# Patient Record
Sex: FEMALE | Race: WHITE | Employment: OTHER | URBAN - METROPOLITAN AREA
[De-identification: names, ages, dates, MRNs, and addresses within clinical notes are randomized per-mention and may not be internally consistent; named-entity substitution may affect disease eponyms.]

---

## 2018-02-15 ENCOUNTER — OFFICE VISIT (OUTPATIENT)
Dept: FAMILY MEDICINE CLINIC | Age: 43
End: 2018-02-15

## 2018-02-15 VITALS
WEIGHT: 166.8 LBS | SYSTOLIC BLOOD PRESSURE: 102 MMHG | RESPIRATION RATE: 16 BRPM | DIASTOLIC BLOOD PRESSURE: 70 MMHG | HEIGHT: 64 IN | BODY MASS INDEX: 28.48 KG/M2 | HEART RATE: 57 BPM | OXYGEN SATURATION: 98 % | TEMPERATURE: 97.9 F

## 2018-02-15 DIAGNOSIS — R51.9 NONINTRACTABLE HEADACHE, UNSPECIFIED CHRONICITY PATTERN, UNSPECIFIED HEADACHE TYPE: Primary | ICD-10-CM

## 2018-02-15 PROCEDURE — 99202 OFFICE O/P NEW SF 15 MIN: CPT | Performed by: FAMILY MEDICINE

## 2018-02-15 NOTE — PROGRESS NOTES
Kamilah Rachel   YOB: 1975    Date of Visit:  2/15/2018        Chief Complaint   Patient presents with    Established New Doctor         HPI:    Patient is a 57-year-old female without significant past medical history presenting for evaluation of headache disorder. She describes her headaches as frontal with involvement of her orbital and temporal areas. The headaches are dull and sometimes throbbing. There has been associated nausea and neck pain on a few occasions. Patient reports to increased frequency and severity of headaches for the several weeks. She has tried Tylenol, Motrin and Vicodin without significant improvement. She has not sought prior medical evaluation for this condition. No Known Allergies         No outpatient prescriptions have been marked as taking for the 2/15/18 encounter (Office Visit) with Carmencita Swanson MD.           Patient's past medical, surgical, social and family histories were reviewed and updated as appropriate. Review of Systems   Constitutional: Negative. Neurological: Positive for headaches. Psychiatric/Behavioral: Negative. Physical Exam   Constitutional: She appears well-developed and well-nourished. No distress. HENT:   Head: Normocephalic and atraumatic. Right Ear: Hearing and external ear normal.   Left Ear: Hearing and external ear normal.   Nose: Nose normal. No rhinorrhea. Eyes: Conjunctivae and EOM are normal. No scleral icterus. Neck: Neck supple. No JVD present. No thyromegaly present. Cardiovascular: Regular rhythm and intact distal pulses. Pulmonary/Chest: Effort normal and breath sounds normal.   Musculoskeletal: She exhibits no edema or tenderness. Neurological: She is alert. She is not disoriented. No cranial nerve deficit. She exhibits normal muscle tone. Coordination normal.   Vitals reviewed.         Vitals:    02/15/18 0951   BP: 102/70   Site: Left Arm   Position: Sitting   Cuff Size: Medium Adult   Pulse: 57   Resp: 16   Temp: 97.9 °F (36.6 °C)   TempSrc: Oral   SpO2: 98%   Weight: 166 lb 12.8 oz (75.7 kg)   Height: 5' 4\" (1.626 m)     Body mass index is 28.63 kg/m². Wt Readings from Last 3 Encounters:   02/15/18 166 lb 12.8 oz (75.7 kg)   08/31/13 197 lb (89.4 kg)   08/30/13 197 lb (89.4 kg)     BP Readings from Last 3 Encounters:   02/15/18 102/70   09/02/13 120/64   08/30/13 111/67            Assessment/Plan     1. Headache disorder  - MRI Brain WO Contrast; Future  - Neurology referral, Rajesh Roman MD  - Lie in darkened room and apply cold packs as needed for pain. Avoid excessive physical activity, smoking and stressful situations as much as possible. - Trial of OTC Excedrin. Side effect profile discussed in detail. Return if symptoms worsen or fail to improve. Return sometime for annual physical.    Please note that some or all of this record was generated using voice recognition software. If there are any questions about the content of this document, please contact the author as some errors in transcription may have occurred.

## 2021-11-29 ENCOUNTER — TELEPHONE (OUTPATIENT)
Dept: FAMILY MEDICINE CLINIC | Age: 46
End: 2021-11-29

## 2021-11-29 NOTE — TELEPHONE ENCOUNTER
----- Message from Karrie Diallo MD sent at 11/28/2021  5:59 AM EST -----  Call pt to get her mammogram done next door, missed last year. Order in [address] [place] [address] Rd.

## 2021-12-01 ENCOUNTER — TELEPHONE (OUTPATIENT)
Dept: FAMILY MEDICINE CLINIC | Age: 46
End: 2021-12-01

## 2021-12-01 NOTE — TELEPHONE ENCOUNTER
----- Message from Christelle Raza MD sent at 11/28/2021  5:59 AM EST -----  Call pt to get her mammogram done next door, missed last year. Order in [address] [place] [address] Rd.

## 2021-12-01 NOTE — TELEPHONE ENCOUNTER
----- Message from New Haddad MD sent at 11/28/2021  5:59 AM EST -----  Call pt to get her mammogram done next door, missed last year. Order in [address] [place] [address] Rd.

## 2021-12-02 NOTE — TELEPHONE ENCOUNTER
----- Message from Tonja Farris sent at 12/1/2021  4:44 PM EST -----  Subject: Message to Provider    QUESTIONS  Information for Provider? Person calling Samantha Quezada is confused as   to why they are calling her. She is not a pt of Dr. Gregoria Guillory and has never   done anything with [facility]. Would like a call back to clarify this  ---------------------------------------------------------------------------  --------------  [address]  What is the best way for the office to contact you? OK to leave message on   voicemail  Preferred Call Back Phone Number? [phone]  ---------------------------------------------------------------------------  --------------  SCRIPT ANSWERS  Relationship to Patient?  Self